# Patient Record
Sex: FEMALE | Race: BLACK OR AFRICAN AMERICAN | Employment: STUDENT | URBAN - METROPOLITAN AREA
[De-identification: names, ages, dates, MRNs, and addresses within clinical notes are randomized per-mention and may not be internally consistent; named-entity substitution may affect disease eponyms.]

---

## 2023-01-03 ENCOUNTER — OFFICE VISIT (OUTPATIENT)
Dept: PRIMARY CARE CLINIC | Age: 23
End: 2023-01-03
Payer: COMMERCIAL

## 2023-01-03 VITALS
DIASTOLIC BLOOD PRESSURE: 70 MMHG | WEIGHT: 221 LBS | BODY MASS INDEX: 35.52 KG/M2 | SYSTOLIC BLOOD PRESSURE: 120 MMHG | HEIGHT: 66 IN | HEART RATE: 75 BPM | OXYGEN SATURATION: 98 %

## 2023-01-03 DIAGNOSIS — D50.9 IRON DEFICIENCY ANEMIA, UNSPECIFIED IRON DEFICIENCY ANEMIA TYPE: ICD-10-CM

## 2023-01-03 DIAGNOSIS — Z11.59 NEED FOR HEPATITIS C SCREENING TEST: ICD-10-CM

## 2023-01-03 DIAGNOSIS — Z00.00 ANNUAL PHYSICAL EXAM: ICD-10-CM

## 2023-01-03 DIAGNOSIS — E55.9 VITAMIN D DEFICIENCY: ICD-10-CM

## 2023-01-03 DIAGNOSIS — Z53.20 HIV SCREENING DECLINED: ICD-10-CM

## 2023-01-03 DIAGNOSIS — Z00.00 ANNUAL PHYSICAL EXAM: Primary | ICD-10-CM

## 2023-01-03 LAB
A/G RATIO: 1.4 (ref 1.1–2.2)
ALBUMIN SERPL-MCNC: 4.3 G/DL (ref 3.4–5)
ALP BLD-CCNC: 54 U/L (ref 40–129)
ALT SERPL-CCNC: 8 U/L (ref 10–40)
ANION GAP SERPL CALCULATED.3IONS-SCNC: 10 MMOL/L (ref 3–16)
AST SERPL-CCNC: 12 U/L (ref 15–37)
BASOPHILS ABSOLUTE: 0 K/UL (ref 0–0.2)
BASOPHILS RELATIVE PERCENT: 0.3 %
BILIRUB SERPL-MCNC: <0.2 MG/DL (ref 0–1)
BUN BLDV-MCNC: 8 MG/DL (ref 7–20)
CALCIUM SERPL-MCNC: 9.6 MG/DL (ref 8.3–10.6)
CHLORIDE BLD-SCNC: 100 MMOL/L (ref 99–110)
CHOLESTEROL, TOTAL: 174 MG/DL (ref 0–199)
CO2: 27 MMOL/L (ref 21–32)
CREAT SERPL-MCNC: 0.5 MG/DL (ref 0.6–1.1)
EOSINOPHILS ABSOLUTE: 0.1 K/UL (ref 0–0.6)
EOSINOPHILS RELATIVE PERCENT: 1.5 %
FERRITIN: 53.6 NG/ML (ref 15–150)
GFR SERPL CREATININE-BSD FRML MDRD: >60 ML/MIN/{1.73_M2}
GLUCOSE BLD-MCNC: 95 MG/DL (ref 70–99)
HCT VFR BLD CALC: 34.3 % (ref 36–48)
HDLC SERPL-MCNC: 45 MG/DL (ref 40–60)
HEMOGLOBIN: 11.2 G/DL (ref 12–16)
HEPATITIS C ANTIBODY INTERPRETATION: NORMAL
IRON SATURATION: 22 % (ref 15–50)
IRON: 72 UG/DL (ref 37–145)
LDL CHOLESTEROL CALCULATED: 110 MG/DL
LYMPHOCYTES ABSOLUTE: 2.6 K/UL (ref 1–5.1)
LYMPHOCYTES RELATIVE PERCENT: 36.6 %
MCH RBC QN AUTO: 30.9 PG (ref 26–34)
MCHC RBC AUTO-ENTMCNC: 32.7 G/DL (ref 31–36)
MCV RBC AUTO: 94.5 FL (ref 80–100)
MONOCYTES ABSOLUTE: 0.4 K/UL (ref 0–1.3)
MONOCYTES RELATIVE PERCENT: 5.9 %
NEUTROPHILS ABSOLUTE: 4 K/UL (ref 1.7–7.7)
NEUTROPHILS RELATIVE PERCENT: 55.7 %
PDW BLD-RTO: 12.5 % (ref 12.4–15.4)
PLATELET # BLD: 227 K/UL (ref 135–450)
PMV BLD AUTO: 8.3 FL (ref 5–10.5)
POTASSIUM SERPL-SCNC: 4.4 MMOL/L (ref 3.5–5.1)
RBC # BLD: 3.63 M/UL (ref 4–5.2)
SODIUM BLD-SCNC: 137 MMOL/L (ref 136–145)
TOTAL IRON BINDING CAPACITY: 328 UG/DL (ref 260–445)
TOTAL PROTEIN: 7.3 G/DL (ref 6.4–8.2)
TRIGL SERPL-MCNC: 94 MG/DL (ref 0–150)
VITAMIN D 25-HYDROXY: 33.4 NG/ML
VLDLC SERPL CALC-MCNC: 19 MG/DL
WBC # BLD: 7.1 K/UL (ref 4–11)

## 2023-01-03 PROCEDURE — 99395 PREV VISIT EST AGE 18-39: CPT | Performed by: FAMILY MEDICINE

## 2023-01-03 PROCEDURE — G8484 FLU IMMUNIZE NO ADMIN: HCPCS | Performed by: FAMILY MEDICINE

## 2023-01-03 RX ORDER — FERROUS SULFATE 137(45) MG
142 TABLET, EXTENDED RELEASE ORAL
COMMUNITY
Start: 2021-06-23

## 2023-01-03 SDOH — HEALTH STABILITY: PHYSICAL HEALTH: ON AVERAGE, HOW MANY MINUTES DO YOU ENGAGE IN EXERCISE AT THIS LEVEL?: 40 MIN

## 2023-01-03 SDOH — ECONOMIC STABILITY: FOOD INSECURITY: WITHIN THE PAST 12 MONTHS, YOU WORRIED THAT YOUR FOOD WOULD RUN OUT BEFORE YOU GOT MONEY TO BUY MORE.: NEVER TRUE

## 2023-01-03 SDOH — HEALTH STABILITY: PHYSICAL HEALTH: ON AVERAGE, HOW MANY DAYS PER WEEK DO YOU ENGAGE IN MODERATE TO STRENUOUS EXERCISE (LIKE A BRISK WALK)?: 2 DAYS

## 2023-01-03 SDOH — ECONOMIC STABILITY: FOOD INSECURITY: WITHIN THE PAST 12 MONTHS, THE FOOD YOU BOUGHT JUST DIDN'T LAST AND YOU DIDN'T HAVE MONEY TO GET MORE.: NEVER TRUE

## 2023-01-03 ASSESSMENT — PATIENT HEALTH QUESTIONNAIRE - PHQ9
2. FEELING DOWN, DEPRESSED OR HOPELESS: 0
SUM OF ALL RESPONSES TO PHQ QUESTIONS 1-9: 0
1. LITTLE INTEREST OR PLEASURE IN DOING THINGS: 0
SUM OF ALL RESPONSES TO PHQ QUESTIONS 1-9: 0
SUM OF ALL RESPONSES TO PHQ QUESTIONS 1-9: 0
SUM OF ALL RESPONSES TO PHQ9 QUESTIONS 1 & 2: 0
SUM OF ALL RESPONSES TO PHQ QUESTIONS 1-9: 0

## 2023-01-03 ASSESSMENT — SOCIAL DETERMINANTS OF HEALTH (SDOH)
HOW HARD IS IT FOR YOU TO PAY FOR THE VERY BASICS LIKE FOOD, HOUSING, MEDICAL CARE, AND HEATING?: NOT HARD AT ALL
WITHIN THE LAST YEAR, HAVE YOU BEEN AFRAID OF YOUR PARTNER OR EX-PARTNER?: NO
WITHIN THE LAST YEAR, HAVE YOU BEEN HUMILIATED OR EMOTIONALLY ABUSED IN OTHER WAYS BY YOUR PARTNER OR EX-PARTNER?: NO
WITHIN THE LAST YEAR, HAVE YOU BEEN KICKED, HIT, SLAPPED, OR OTHERWISE PHYSICALLY HURT BY YOUR PARTNER OR EX-PARTNER?: NO
WITHIN THE LAST YEAR, HAVE TO BEEN RAPED OR FORCED TO HAVE ANY KIND OF SEXUAL ACTIVITY BY YOUR PARTNER OR EX-PARTNER?: NO

## 2023-01-03 ASSESSMENT — ENCOUNTER SYMPTOMS
VOMITING: 0
ABDOMINAL PAIN: 0
EYE ITCHING: 0
SORE THROAT: 0
TROUBLE SWALLOWING: 0
COUGH: 0
DIARRHEA: 0
NAUSEA: 0
SHORTNESS OF BREATH: 0
EYE DISCHARGE: 0

## 2023-01-03 NOTE — PROGRESS NOTES
1/3/2023    Fadi Miller (:  2000) is a 23 y.o. female, here for a preventive medicine evaluation. Patient Active Problem List   Diagnosis    Iron deficiency anemia    Vitamin D deficiency     Vit D   - on supplements    Iron  Heavy periods  - on supplements        Review of Systems   Constitutional:  Negative for appetite change, chills, fatigue and fever. HENT:  Negative for congestion, ear pain, sneezing, sore throat and trouble swallowing. Eyes:  Negative for discharge and itching. Respiratory:  Negative for cough and shortness of breath. Cardiovascular:  Positive for chest pain (Post COVID, has not happened since last summer). Negative for leg swelling. Gastrointestinal:  Negative for abdominal pain, diarrhea, nausea and vomiting. Genitourinary:  Negative for dysuria and urgency. Musculoskeletal:  Negative for joint swelling and neck pain. Neurological:  Negative for light-headedness and headaches. Psychiatric/Behavioral:  Negative for dysphoric mood. The patient is not nervous/anxious. Prior to Visit Medications    Medication Sig Taking? Authorizing Provider   vitamin D 25 MCG (1000 UT) CAPS Take 25 mcg by mouth daily Yes Historical Provider, MD   ferrous sulfate (SLOW FE) 142 (45 Fe) MG extended release tablet Take 142 mg by mouth Yes Historical Provider, MD        No Known Allergies    Past Medical History:   Diagnosis Date    G6PD deficiency        History reviewed. No pertinent surgical history.     Social History     Socioeconomic History    Marital status: Single     Spouse name: Not on file    Number of children: Not on file    Years of education: Not on file    Highest education level: Not on file   Occupational History    Not on file   Tobacco Use    Smoking status: Never    Smokeless tobacco: Never   Substance and Sexual Activity    Alcohol use: Never    Drug use: Never    Sexual activity: Yes     Partners: Male, Female     Birth control/protection: I.U.D., Condom   Other Topics Concern    Not on file   Social History Narrative    Not on file     Social Determinants of Health     Financial Resource Strain: Low Risk     Difficulty of Paying Living Expenses: Not hard at all   Food Insecurity: No Food Insecurity    Worried About Running Out of Food in the Last Year: Never true    Ran Out of Food in the Last Year: Never true   Transportation Needs: Not on file   Physical Activity: Insufficiently Active    Days of Exercise per Week: 2 days    Minutes of Exercise per Session: 40 min   Stress: Not on file   Social Connections: Not on file   Intimate Partner Violence: Not At Risk    Fear of Current or Ex-Partner: No    Emotionally Abused: No    Physically Abused: No    Sexually Abused: No   Housing Stability: Not on file        Family History   Problem Relation Age of Onset    Hypertension Mother     Other Mother         G6PD def    Other Father     Crohn's Disease Father     Other Brother        ADVANCE DIRECTIVE: N, <no information>    Vitals:    01/03/23 1522   BP: 120/70   Site: Right Upper Arm   Position: Sitting   Cuff Size: Medium Adult   Pulse: 75   SpO2: 98%   Weight: 221 lb (100.2 kg)   Height: 5' 6\" (1.676 m)     Estimated body mass index is 35.67 kg/m² as calculated from the following:    Height as of this encounter: 5' 6\" (1.676 m). Weight as of this encounter: 221 lb (100.2 kg). Physical Exam  Constitutional:       General: She is not in acute distress. Appearance: Normal appearance. HENT:      Head: Normocephalic and atraumatic. Right Ear: Tympanic membrane and external ear normal.      Left Ear: Tympanic membrane and external ear normal.      Nose: Nose normal. No congestion or rhinorrhea. Eyes:      General:         Right eye: No discharge. Left eye: No discharge. Extraocular Movements: Extraocular movements intact.       Conjunctiva/sclera: Conjunctivae normal.   Cardiovascular:      Rate and Rhythm: Normal rate and regular rhythm. Heart sounds: Normal heart sounds. No murmur heard. Pulmonary:      Effort: Pulmonary effort is normal.      Breath sounds: Normal breath sounds. No wheezing. Chest:      Chest wall: No tenderness. Abdominal:      General: Bowel sounds are normal. There is no distension. Palpations: Abdomen is soft. Tenderness: There is no abdominal tenderness. Musculoskeletal:         General: No swelling or tenderness. Normal range of motion. Cervical back: Normal range of motion and neck supple. Skin:     General: Skin is warm and dry. Neurological:      General: No focal deficit present. Mental Status: She is alert and oriented to person, place, and time. Psychiatric:         Mood and Affect: Mood normal.         Behavior: Behavior normal.       No flowsheet data found. No results found for: CHOL, CHOLFAST, TRIG, TRIGLYCFAST, HDL, LDLCHOLESTEROL, LDLCALC, GLUF, GLUCOSE, LABA1C    The ASCVD Risk score (Padmini DIANA, et al., 2019) failed to calculate for the following reasons:     The 2019 ASCVD risk score is only valid for ages 36 to 78    Immunization History   Administered Date(s) Administered    COVID-19, [facility], Primary or Immunocompromised, (age 12y+), IM, 100 mcg/0.5mL 04/16/2021, 05/20/2021    DTaP, 5 Pertussis Antigens (Daptacel) 2002, 2002, 2002, 10/09/2003, 05/05/2006    HIB PRP-T (ActHIB, Hiberix) 2002, 2002, 04/07/2003    HPV Quadrivalent (Gardasil) 11/07/2016, 03/24/2017    Hepatitis A Ped/Adol (Havrix, Vaqta) 01/22/2009, 09/16/2009    Hepatitis B Ped/Adol (Engerix-B, Recombivax HB) 2002, 2002, 2002, 12/20/2016    MMR 07/15/2003, 05/05/2006    Meningococcal MCV4O (Menveo) 08/09/2017    Meningococcal MCV4P (Menactra) 11/30/2016    Pneumococcal Conjugate 13-valent (Alok Card) 2002, 2002, 2002, 07/15/2003    Polio IPV (IPOL) 2002, 01/17/2003, 06/10/2005, 05/05/2006, 11/20/2014    Tdap (Boostrix, Adacel) 11/20/2014, 12/20/2016    Varicella (Varivax) 04/17/2003, 01/22/2009       Health Maintenance   Topic Date Due    Depression Screen  Never done    HIV screen  Never done    HPV vaccine (3 - 2-dose series) 06/16/2017    [address] screen  Never done    Hepatitis C screen  Never done    COVID-19 Vaccine (3 - Booster for Moderna series) 07/15/2021    Flu vaccine (1) Never done    DTaP/Tdap/Td vaccine (5 - Td or Tdap) 12/20/2026    Hepatitis A vaccine  Completed    Hib vaccine  Completed    Varicella vaccine  Completed    Pneumococcal 0-64 years Vaccine  Completed    Meningococcal (ACWY) vaccine  Aged Out       Assessment & Plan   Annual physical exam  -     Vitamin D 25 Hydroxy; Future  -     CBC with Auto Differential; Future  -     Comprehensive Metabolic Panel; Future  -     LIPID PANEL; Future  Vitamin D deficiency  -     Vitamin D 25 Hydroxy; Future  Iron deficiency anemia, unspecified iron deficiency anemia type  -     CBC with Auto Differential; Future  -     Iron and TIBC; Future  -     Ferritin; Future  Need for hepatitis C screening test  -     Hepatitis C Antibody; Future  HIV screening declined  -     HIV Screen; Future  Return if symptoms worsen or fail to improve.          --Marck Weaver MD

## 2023-01-04 LAB
HIV AG/AB: NORMAL
HIV ANTIGEN: NORMAL
HIV-1 ANTIBODY: NORMAL
HIV-2 AB: NORMAL

## 2023-01-06 DIAGNOSIS — Z83.49 FAMILY HISTORY OF GLUCOSE-6-PHOSPHATASE DEFICIENCY (G6PD): ICD-10-CM

## 2023-01-10 LAB — G-6-PD, QUANT: 10 U/G HB (ref 9.9–16.6)

## 2024-07-29 SDOH — HEALTH STABILITY: PHYSICAL HEALTH: ON AVERAGE, HOW MANY MINUTES DO YOU ENGAGE IN EXERCISE AT THIS LEVEL?: 40 MIN

## 2024-07-29 SDOH — HEALTH STABILITY: PHYSICAL HEALTH: ON AVERAGE, HOW MANY DAYS PER WEEK DO YOU ENGAGE IN MODERATE TO STRENUOUS EXERCISE (LIKE A BRISK WALK)?: 3 DAYS

## 2024-07-30 ENCOUNTER — OFFICE VISIT (OUTPATIENT)
Dept: PRIMARY CARE CLINIC | Age: 24
End: 2024-07-30
Payer: COMMERCIAL

## 2024-07-30 VITALS
OXYGEN SATURATION: 98 % | TEMPERATURE: 98 F | HEART RATE: 89 BPM | HEIGHT: 66 IN | SYSTOLIC BLOOD PRESSURE: 120 MMHG | DIASTOLIC BLOOD PRESSURE: 70 MMHG | BODY MASS INDEX: 37.28 KG/M2 | WEIGHT: 232 LBS

## 2024-07-30 DIAGNOSIS — R22.31 LUMP IN ARMPIT, RIGHT: ICD-10-CM

## 2024-07-30 DIAGNOSIS — R22.42 MASS OF FOOT, LEFT: ICD-10-CM

## 2024-07-30 DIAGNOSIS — Z13.1 SCREENING FOR DIABETES MELLITUS: ICD-10-CM

## 2024-07-30 DIAGNOSIS — E55.9 VITAMIN D DEFICIENCY: ICD-10-CM

## 2024-07-30 DIAGNOSIS — Z00.00 ANNUAL PHYSICAL EXAM: Primary | ICD-10-CM

## 2024-07-30 DIAGNOSIS — L30.9 DERMATITIS: ICD-10-CM

## 2024-07-30 DIAGNOSIS — R82.998 LEUKOCYTES IN URINE: ICD-10-CM

## 2024-07-30 LAB
BILIRUBIN, POC: NEGATIVE
BLOOD URINE, POC: NEGATIVE
CLARITY, POC: CLEAR
COLOR, POC: YELLOW
GLUCOSE URINE, POC: NEGATIVE
KETONES, POC: NEGATIVE
LEUKOCYTE EST, POC: NORMAL
NITRITE, POC: NEGATIVE
PH, POC: 5.5
PROTEIN, POC: NEGATIVE
SPECIFIC GRAVITY, POC: 1.02
UROBILINOGEN, POC: 0.2

## 2024-07-30 PROCEDURE — 99395 PREV VISIT EST AGE 18-39: CPT | Performed by: INTERNAL MEDICINE

## 2024-07-30 PROCEDURE — 81002 URINALYSIS NONAUTO W/O SCOPE: CPT | Performed by: INTERNAL MEDICINE

## 2024-07-30 SDOH — ECONOMIC STABILITY: FOOD INSECURITY: WITHIN THE PAST 12 MONTHS, YOU WORRIED THAT YOUR FOOD WOULD RUN OUT BEFORE YOU GOT MONEY TO BUY MORE.: NEVER TRUE

## 2024-07-30 SDOH — ECONOMIC STABILITY: INCOME INSECURITY: HOW HARD IS IT FOR YOU TO PAY FOR THE VERY BASICS LIKE FOOD, HOUSING, MEDICAL CARE, AND HEATING?: NOT HARD AT ALL

## 2024-07-30 SDOH — ECONOMIC STABILITY: FOOD INSECURITY: WITHIN THE PAST 12 MONTHS, THE FOOD YOU BOUGHT JUST DIDN'T LAST AND YOU DIDN'T HAVE MONEY TO GET MORE.: NEVER TRUE

## 2024-07-30 ASSESSMENT — PATIENT HEALTH QUESTIONNAIRE - PHQ9
SUM OF ALL RESPONSES TO PHQ QUESTIONS 1-9: 0
1. LITTLE INTEREST OR PLEASURE IN DOING THINGS: NOT AT ALL
SUM OF ALL RESPONSES TO PHQ QUESTIONS 1-9: 0
SUM OF ALL RESPONSES TO PHQ QUESTIONS 1-9: 0
2. FEELING DOWN, DEPRESSED OR HOPELESS: NOT AT ALL
SUM OF ALL RESPONSES TO PHQ9 QUESTIONS 1 & 2: 0
SUM OF ALL RESPONSES TO PHQ QUESTIONS 1-9: 0

## 2024-07-30 NOTE — PROGRESS NOTES
Date of Visit: 2024    Marcia Blackman (:  2000) is a 24 y.o. female,  Established patient here for evaluation of the following chief complaint(s):  New Patient (Establishing care./) and Foot Injury (Foot injury happened 3 months ago. /Following up for that.)      ASSESSMENT/PLAN:    1. Annual physical exam  Assessment & Plan:  - CBC with auto differential  -Comprehensive metabolic panel  -TSH  -Lipid panel  -Urinalysis  -Pap smear done in  per patient  -  Orders:  -     CBC with Auto Differential; Future  -     Comprehensive Metabolic Panel; Future  -     Lipid Panel; Future  -     TSH; Future  -     POCT Urinalysis no Micro  2. Vitamin D deficiency  Assessment & Plan:  -May need to resume vitamin D if low on labs   -Vitamin D 25 hydroxy  Orders:  -     Vitamin D 25 Hydroxy; Future  3. Dermatitis  Assessment & Plan:  -Referral to Dermatology   Orders:  -     [facility] - María Elena Portillo PA, Dermatology, [place]  4. Mass of foot, left  Assessment & Plan:  -Referral to Podiatry  Orders:  -     [facility] - Frederick Panchal DPM, Podiatry, [place]  5. Lump in armpit, right  Assessment & Plan:  -Ultrasound right axilla  Orders:  -     US SOFT TISSUE LIMITED AREA; Future  6. Leukocytes in urine  Assessment & Plan:  -Urine culture   Orders:  -     Culture, Urine  7. Screening for diabetes mellitus  Assessment & Plan:  -Hemoglobin A1c   Orders:  -     Hemoglobin A1C; Future        Return in about 1 year (around 2025) for annual physical exam.    SUBJECTIVE:    Patient presents for annual physical exam. Pap smear done in . LMP 24.    Patient just graduated from [employer] and plans to study abroad for 10 months in [place].    Patient has vitamin D deficiency. Patient hasn't been taking vitamin D.     Patient states she had a foot injury 4 months ago and still has a bump on heel. Patient states a bottom of a door clipped her heel that someone was swinging open. Patient

## 2024-07-31 ENCOUNTER — HOSPITAL ENCOUNTER (OUTPATIENT)
Dept: ULTRASOUND IMAGING | Age: 24
Discharge: HOME | End: 2024-07-31
Payer: COMMERCIAL

## 2024-07-31 DIAGNOSIS — R22.31 LUMP IN ARMPIT, RIGHT: ICD-10-CM

## 2024-07-31 DIAGNOSIS — Z00.00 ANNUAL PHYSICAL EXAM: ICD-10-CM

## 2024-07-31 DIAGNOSIS — Z13.1 SCREENING FOR DIABETES MELLITUS: ICD-10-CM

## 2024-07-31 DIAGNOSIS — E55.9 VITAMIN D DEFICIENCY: ICD-10-CM

## 2024-07-31 LAB — BACTERIA UR CULT: NORMAL

## 2024-07-31 PROCEDURE — 76999 ECHO EXAMINATION PROCEDURE: CPT

## 2024-08-01 LAB
25(OH)D3 SERPL-MCNC: 23.8 NG/ML
ALBUMIN SERPL-MCNC: 4.3 G/DL (ref 3.4–5)
ALBUMIN/GLOB SERPL: 1.4 {RATIO} (ref 1.1–2.2)
ALP SERPL-CCNC: 47 U/L (ref 40–129)
ALT SERPL-CCNC: 11 U/L (ref 10–40)
ANION GAP SERPL CALCULATED.3IONS-SCNC: 12 MMOL/L (ref 3–16)
AST SERPL-CCNC: 15 U/L (ref 15–37)
BASOPHILS # BLD: 0 K/UL (ref 0–0.2)
BASOPHILS NFR BLD: 0.4 %
BILIRUB SERPL-MCNC: 0.3 MG/DL (ref 0–1)
BUN SERPL-MCNC: 7 MG/DL (ref 7–20)
CALCIUM SERPL-MCNC: 9.3 MG/DL (ref 8.3–10.6)
CHLORIDE SERPL-SCNC: 104 MMOL/L (ref 99–110)
CHOLEST SERPL-MCNC: 173 MG/DL (ref 0–199)
CO2 SERPL-SCNC: 23 MMOL/L (ref 21–32)
CREAT SERPL-MCNC: 0.6 MG/DL (ref 0.6–1.1)
DEPRECATED RDW RBC AUTO: 13 % (ref 12.4–15.4)
EOSINOPHIL # BLD: 0.1 K/UL (ref 0–0.6)
EOSINOPHIL NFR BLD: 1.2 %
EST. AVERAGE GLUCOSE BLD GHB EST-MCNC: 88.2 MG/DL
GFR SERPLBLD CREATININE-BSD FMLA CKD-EPI: >90 ML/MIN/{1.73_M2}
GLUCOSE SERPL-MCNC: 88 MG/DL (ref 70–99)
HBA1C MFR BLD: 4.7 %
HCT VFR BLD AUTO: 34.3 % (ref 36–48)
HDLC SERPL-MCNC: 44 MG/DL (ref 40–60)
HGB BLD-MCNC: 11.4 G/DL (ref 12–16)
LDLC SERPL CALC-MCNC: 109 MG/DL
LYMPHOCYTES # BLD: 2.3 K/UL (ref 1–5.1)
LYMPHOCYTES NFR BLD: 35.4 %
MCH RBC QN AUTO: 30.9 PG (ref 26–34)
MCHC RBC AUTO-ENTMCNC: 33.2 G/DL (ref 31–36)
MCV RBC AUTO: 92.8 FL (ref 80–100)
MONOCYTES # BLD: 0.5 K/UL (ref 0–1.3)
MONOCYTES NFR BLD: 7 %
NEUTROPHILS # BLD: 3.7 K/UL (ref 1.7–7.7)
NEUTROPHILS NFR BLD: 56 %
PLATELET # BLD AUTO: 222 K/UL (ref 135–450)
PMV BLD AUTO: 8.4 FL (ref 5–10.5)
POTASSIUM SERPL-SCNC: 4.3 MMOL/L (ref 3.5–5.1)
PROT SERPL-MCNC: 7.3 G/DL (ref 6.4–8.2)
RBC # BLD AUTO: 3.69 M/UL (ref 4–5.2)
SODIUM SERPL-SCNC: 139 MMOL/L (ref 136–145)
TRIGL SERPL-MCNC: 102 MG/DL (ref 0–150)
TSH SERPL DL<=0.005 MIU/L-ACNC: 2.04 UIU/ML (ref 0.27–4.2)
VLDLC SERPL CALC-MCNC: 20 MG/DL
WBC # BLD AUTO: 6.6 K/UL (ref 4–11)

## 2024-08-13 PROBLEM — L30.9 DERMATITIS: Status: ACTIVE | Noted: 2024-08-13

## 2024-08-13 PROBLEM — Z00.00 ANNUAL PHYSICAL EXAM: Status: ACTIVE | Noted: 2024-08-13

## 2024-08-13 PROBLEM — R82.998 LEUKOCYTES IN URINE: Status: ACTIVE | Noted: 2024-08-13

## 2024-08-13 PROBLEM — R22.42 MASS OF FOOT, LEFT: Status: ACTIVE | Noted: 2024-08-13

## 2024-08-13 PROBLEM — Z13.1 SCREENING FOR DIABETES MELLITUS: Status: ACTIVE | Noted: 2024-08-13

## 2024-08-13 PROBLEM — R22.31 LUMP IN ARMPIT, RIGHT: Status: ACTIVE | Noted: 2024-08-13

## 2024-08-13 NOTE — ASSESSMENT & PLAN NOTE
- CBC with auto differential  -Comprehensive metabolic panel  -TSH  -Lipid panel  -Urinalysis  -Pap smear done in 2021 per patient  -Regular aerobic exercise